# Patient Record
Sex: MALE | Race: WHITE | NOT HISPANIC OR LATINO | Employment: FULL TIME | URBAN - NONMETROPOLITAN AREA
[De-identification: names, ages, dates, MRNs, and addresses within clinical notes are randomized per-mention and may not be internally consistent; named-entity substitution may affect disease eponyms.]

---

## 2018-05-29 ENCOUNTER — HOSPITAL ENCOUNTER (EMERGENCY)
Facility: HOSPITAL | Age: 28
Discharge: HOME | End: 2018-05-29
Attending: EMERGENCY MEDICINE | Admitting: EMERGENCY MEDICINE

## 2018-05-29 ENCOUNTER — APPOINTMENT (OUTPATIENT)
Dept: GENERAL RADIOLOGY | Facility: HOSPITAL | Age: 28
End: 2018-05-29

## 2018-05-29 VITALS
SYSTOLIC BLOOD PRESSURE: 118 MMHG | HEIGHT: 71 IN | TEMPERATURE: 97.6 F | OXYGEN SATURATION: 95 % | BODY MASS INDEX: 23.8 KG/M2 | HEART RATE: 73 BPM | DIASTOLIC BLOOD PRESSURE: 67 MMHG | WEIGHT: 170 LBS | RESPIRATION RATE: 18 BRPM

## 2018-05-29 DIAGNOSIS — S61.012A LACERATION OF LEFT THUMB WITHOUT FOREIGN BODY WITHOUT DAMAGE TO NAIL, INITIAL ENCOUNTER: Primary | ICD-10-CM

## 2018-05-29 DIAGNOSIS — IMO0002 TENDON LACERATION: ICD-10-CM

## 2018-05-29 PROCEDURE — 90715 TDAP VACCINE 7 YRS/> IM: CPT | Performed by: EMERGENCY MEDICINE

## 2018-05-29 PROCEDURE — 99284 EMERGENCY DEPT VISIT MOD MDM: CPT

## 2018-05-29 PROCEDURE — 90471 IMMUNIZATION ADMIN: CPT | Performed by: EMERGENCY MEDICINE

## 2018-05-29 PROCEDURE — 73130 X-RAY EXAM OF HAND: CPT

## 2018-05-29 PROCEDURE — 25010000002 TDAP 5-2.5-18.5 LF-MCG/0.5 SUSPENSION: Performed by: EMERGENCY MEDICINE

## 2018-05-29 RX ORDER — GINSENG 100 MG
CAPSULE ORAL 2 TIMES DAILY
Qty: 14 G | Refills: 0 | Status: SHIPPED | OUTPATIENT
Start: 2018-05-29 | End: 2018-06-05

## 2018-05-29 RX ORDER — LEVOFLOXACIN 500 MG/1
500 TABLET, FILM COATED ORAL ONCE
Status: COMPLETED | OUTPATIENT
Start: 2018-05-29 | End: 2018-05-29

## 2018-05-29 RX ORDER — CIPROFLOXACIN 500 MG/1
500 TABLET, FILM COATED ORAL 2 TIMES DAILY
Qty: 10 TABLET | Refills: 0 | Status: SHIPPED | OUTPATIENT
Start: 2018-05-29 | End: 2018-06-29

## 2018-05-29 RX ORDER — DIAPER,BRIEF,INFANT-TODD,DISP
EACH MISCELLANEOUS
Status: COMPLETED
Start: 2018-05-29 | End: 2018-05-29

## 2018-05-29 RX ORDER — IBUPROFEN 600 MG/1
600 TABLET ORAL EVERY 8 HOURS PRN
Qty: 21 TABLET | Refills: 0 | Status: SHIPPED | OUTPATIENT
Start: 2018-05-29 | End: 2018-06-29

## 2018-05-29 RX ORDER — OXYCODONE HYDROCHLORIDE AND ACETAMINOPHEN 5; 325 MG/1; MG/1
1 TABLET ORAL ONCE
Status: COMPLETED | OUTPATIENT
Start: 2018-05-29 | End: 2018-05-29

## 2018-05-29 RX ORDER — DIAPER,BRIEF,INFANT-TODD,DISP
EACH MISCELLANEOUS ONCE
Status: COMPLETED | OUTPATIENT
Start: 2018-05-29 | End: 2018-05-29

## 2018-05-29 RX ADMIN — LEVOFLOXACIN 500 MG: 500 TABLET, FILM COATED ORAL at 22:34

## 2018-05-29 RX ADMIN — OXYCODONE HYDROCHLORIDE AND ACETAMINOPHEN 1 TABLET: 5; 325 TABLET ORAL at 21:14

## 2018-05-29 RX ADMIN — Medication: at 22:41

## 2018-05-29 RX ADMIN — TETANUS TOXOID, REDUCED DIPHTHERIA TOXOID AND ACELLULAR PERTUSSIS VACCINE, ADSORBED 0.5 ML: 5; 2.5; 8; 8; 2.5 SUSPENSION INTRAMUSCULAR at 21:15

## 2018-05-29 RX ADMIN — BACITRACIN: 500 OINTMENT TOPICAL at 22:41

## 2018-06-01 ENCOUNTER — OFFICE VISIT (OUTPATIENT)
Dept: ORTHOPEDIC SURGERY | Facility: CLINIC | Age: 28
End: 2018-06-01

## 2018-06-01 VITALS — HEIGHT: 71 IN | WEIGHT: 171 LBS | BODY MASS INDEX: 23.94 KG/M2

## 2018-06-01 DIAGNOSIS — S56.522A: ICD-10-CM

## 2018-06-01 DIAGNOSIS — M79.645 PAIN OF LEFT THUMB: Primary | ICD-10-CM

## 2018-06-01 DIAGNOSIS — S51.002A: ICD-10-CM

## 2018-06-01 PROCEDURE — 99204 OFFICE O/P NEW MOD 45 MIN: CPT | Performed by: ORTHOPAEDIC SURGERY

## 2018-06-01 RX ORDER — BUPIVACAINE HCL/0.9 % NACL/PF 0.1 %
2 PLASTIC BAG, INJECTION (ML) EPIDURAL EVERY 8 HOURS
Status: CANCELLED | OUTPATIENT
Start: 2018-06-07 | End: 2018-06-08

## 2018-06-01 NOTE — PROGRESS NOTES
Wilner Lobato is a 27 y.o. male   Primary provider:  No Known Provider       Chief Complaint   Patient presents with   • Left Thumb - Pain       HISTORY OF PRESENT ILLNESS: Patient being seen for left thumb pain due to injury occurring on 5/29/2018. Patient seen at  ED and x-rays done.     Pain   This is a new problem. The current episode started in the past 7 days. The problem occurs constantly. Associated symptoms comments: Stabbing, aching, bruising, swelling. The symptoms are aggravated by walking (driving).        Injured left hand when swinging an axe.  Lacerated thumb.  Injured and was evaluated in the emergency dept.        CONCURRENT MEDICAL HISTORY:    History reviewed. No pertinent past medical history.    Allergies   Allergen Reactions   • Penicillins          Current Outpatient Prescriptions:   •  bacitracin 500 UNIT/GM ointment, Apply  topically 2 (Two) Times a Day., Disp: 14 g, Rfl: 0  •  ciprofloxacin (CIPRO) 500 MG tablet, Take 1 tablet by mouth 2 (Two) Times a Day., Disp: 10 tablet, Rfl: 0  •  ibuprofen (ADVIL,MOTRIN) 600 MG tablet, Take 1 tablet by mouth Every 8 (Eight) Hours As Needed for Moderate Pain ., Disp: 21 tablet, Rfl: 0    History reviewed. No pertinent surgical history.    History reviewed. No pertinent family history.     Social History     Social History   • Marital status: Single     Spouse name: N/A   • Number of children: N/A   • Years of education: N/A     Occupational History   • Not on file.     Social History Main Topics   • Smoking status: Former Smoker     Years: 5.00   • Smokeless tobacco: Never Used   • Alcohol use No   • Drug use: Unknown   • Sexual activity: Not on file     Other Topics Concern   • Not on file     Social History Narrative   • No narrative on file        Review of Systems   Constitutional: Negative.    HENT: Negative.    Eyes: Negative.    Respiratory: Negative.    Cardiovascular: Negative.    Gastrointestinal: Negative.    Endocrine: Negative.   "  Genitourinary: Negative.    Musculoskeletal: Negative.    Skin: Negative.    Allergic/Immunologic: Negative.    Neurological: Negative.    Hematological: Negative.    Psychiatric/Behavioral: Negative.        PHYSICAL EXAMINATION:       Ht 180.3 cm (71\")   Wt 77.6 kg (171 lb)   BMI 23.85 kg/m²     Physical Exam    GAIT:     [x]  Normal  []  Antalgic    Assistive device: [x]  None  []  Walker     []  Crutches  []  Cane     []  Wheelchair  []  Stretcher    Left Hand Exam     Tenderness   The patient is experiencing tenderness in the dorsal area.     Range of Motion     Wrist   Extension: 5   Flexion: 10     Hand   MP Thumb: 0   MP Index: 90   MP Middle: 90   MP Rin   DIP Thumb: 0     Other   Erythema: absent          Laceration left thumb over extensor surface metacarpal phalangeal joint.     Xr Hand 3+ View Left    Result Date: 2018  Narrative: Exam: Left hand three views INDICATION: Injury FINDINGS: Three views. No acute fracture or dislocation. Joint spaces are maintained. No lytic or destructive lesion. No radiopaque foreign body.     Impression: No fracture or radiopaque foreign body. Electronically signed by:  Hosea Cho MD  2018 10:15 PM CDT Workstation: [id number]          ASSESSMENT:    Diagnoses and all orders for this visit:    Pain of left thumb    Extensor tendon laceration of elbow with open wound, left, initial encounter  -     Case Request; Standing  -     ceFAZolin (ANCEF) 2 g in sodium chloride 0.9 % 100 mL IVPB; Infuse 2 g into a venous catheter Every 8 (Eight) Hours.    Other orders  -     Obtain informed consent; Future  -     Obtain Informed Consent; Standing  -     aPTT; Standing  -     Basic Metabolic Panel; Standing  -     CBC (No Diff); Standing  -     Protime-INR; Standing          PLAN    Discussed treatment options, recommend repair extensor tendon, discussed risks including rerupture, failure and revision surgery , which he understands and agrees to proceed.  "         John Flanagan MD

## 2018-06-05 ENCOUNTER — APPOINTMENT (OUTPATIENT)
Dept: PREADMISSION TESTING | Facility: HOSPITAL | Age: 28
End: 2018-06-05

## 2018-06-05 VITALS
WEIGHT: 199 LBS | HEART RATE: 69 BPM | BODY MASS INDEX: 27.86 KG/M2 | RESPIRATION RATE: 12 BRPM | OXYGEN SATURATION: 98 % | DIASTOLIC BLOOD PRESSURE: 60 MMHG | HEIGHT: 71 IN | SYSTOLIC BLOOD PRESSURE: 108 MMHG

## 2018-06-05 LAB
ANION GAP SERPL CALCULATED.3IONS-SCNC: 11 MMOL/L (ref 5–15)
APTT PPP: 28.5 SECONDS (ref 20–40.3)
BUN BLD-MCNC: 12 MG/DL (ref 7–21)
BUN/CREAT SERPL: 14.8 (ref 7–25)
CALCIUM SPEC-SCNC: 9.9 MG/DL (ref 8.4–10.2)
CHLORIDE SERPL-SCNC: 105 MMOL/L (ref 95–110)
CO2 SERPL-SCNC: 26 MMOL/L (ref 22–31)
CREAT BLD-MCNC: 0.81 MG/DL (ref 0.7–1.3)
DEPRECATED RDW RBC AUTO: 41.6 FL (ref 35.1–43.9)
ERYTHROCYTE [DISTWIDTH] IN BLOOD BY AUTOMATED COUNT: 12.7 % (ref 11.5–14.5)
GFR SERPL CREATININE-BSD FRML MDRD: 113 ML/MIN/1.73 (ref 77–179)
GLUCOSE BLD-MCNC: 115 MG/DL (ref 60–100)
HCT VFR BLD AUTO: 44.2 % (ref 39–49)
HGB BLD-MCNC: 15.6 G/DL (ref 13.7–17.3)
INR PPP: 1 (ref 0.8–1.2)
MCH RBC QN AUTO: 31.7 PG (ref 26.5–34)
MCHC RBC AUTO-ENTMCNC: 35.3 G/DL (ref 31.5–36.3)
MCV RBC AUTO: 89.8 FL (ref 80–98)
PLATELET # BLD AUTO: 236 10*3/MM3 (ref 150–450)
PMV BLD AUTO: 10.4 FL (ref 8–12)
POTASSIUM BLD-SCNC: 4.2 MMOL/L (ref 3.5–5.1)
PROTHROMBIN TIME: 13 SECONDS (ref 11.1–15.3)
RBC # BLD AUTO: 4.92 10*6/MM3 (ref 4.37–5.74)
SODIUM BLD-SCNC: 142 MMOL/L (ref 137–145)
WBC NRBC COR # BLD: 5.48 10*3/MM3 (ref 3.2–9.8)

## 2018-06-05 PROCEDURE — 85610 PROTHROMBIN TIME: CPT | Performed by: ORTHOPAEDIC SURGERY

## 2018-06-05 PROCEDURE — 85730 THROMBOPLASTIN TIME PARTIAL: CPT | Performed by: ORTHOPAEDIC SURGERY

## 2018-06-05 PROCEDURE — 80048 BASIC METABOLIC PNL TOTAL CA: CPT | Performed by: ORTHOPAEDIC SURGERY

## 2018-06-05 PROCEDURE — 85027 COMPLETE CBC AUTOMATED: CPT | Performed by: ORTHOPAEDIC SURGERY

## 2018-06-05 RX ORDER — SODIUM CHLORIDE, SODIUM GLUCONATE, SODIUM ACETATE, POTASSIUM CHLORIDE, AND MAGNESIUM CHLORIDE 526; 502; 368; 37; 30 MG/100ML; MG/100ML; MG/100ML; MG/100ML; MG/100ML
1000 INJECTION, SOLUTION INTRAVENOUS CONTINUOUS
Status: CANCELLED | OUTPATIENT
Start: 2018-06-07

## 2018-06-05 NOTE — DISCHARGE INSTRUCTIONS
[facility]  Pre-op Information and Guidelines    You will be called after 2 p.m. the day before your surgery (Friday for Monday surgery) and notified of your time for arrival and approximate surgery time.  If you have not received a call by 4P.M., please contact Same Day Surgery at [phone] of if outside [facility] call [phone].    Please Follow these Important Safety Guidelines:    • The morning of your procedure, take only the medications listed below with   A sip of water:_____________________________________________       ______________________________________________    • DO NOT eat or drink anything after 12:00 midnight the night before surgery  Specific instructions concerning drinking clear liquids will be discussed during  the pre-surgery instruction call the day before your surgery.    • If you take a blood thinner (ex. Plavix, Coumadin, aspirin), ask your doctor when to stop it before surgery  STOP DATE: _________________    • Only 2 visitors are allowed in patient rooms at a time  Your visitors will be asked to wait in the lobby until the admission process is complete with the exception of a parent with a child and patients in need of special assistance.    • YOU CANNOT DRIVE YOURSELF HOME  You must be accompanied by someone who will be responsible for driving you home after surgery and for your care at home.    • DO NOT chew gum, use breath mints, hard candy, or smoke the day of surgery  • DO NOT drink alcohol for at least 24 hours before your surgery  • DO NOT wear any jewelry and remove all body piercing before coming to the hospital  • DO NOT wear make-up to the hospital  • If you are having surgery on an extremity (arm/leg/foot) remove nail polish/artificial nails on the surgical side  • Clothing, glasses, contacts, dentures, and hairpieces must be removed before surgery  • Bathe the night before or the morning of your surgery and do not use powders/lotions on  skin.

## 2018-06-06 ENCOUNTER — ANESTHESIA EVENT (OUTPATIENT)
Dept: PERIOP | Facility: HOSPITAL | Age: 28
End: 2018-06-06

## 2018-06-07 ENCOUNTER — HOSPITAL ENCOUNTER (OUTPATIENT)
Facility: HOSPITAL | Age: 28
Setting detail: HOSPITAL OUTPATIENT SURGERY
Discharge: HOME | End: 2018-06-07
Attending: ORTHOPAEDIC SURGERY | Admitting: ORTHOPAEDIC SURGERY

## 2018-06-07 ENCOUNTER — ANESTHESIA (OUTPATIENT)
Dept: PERIOP | Facility: HOSPITAL | Age: 28
End: 2018-06-07

## 2018-06-07 VITALS
HEIGHT: 71 IN | RESPIRATION RATE: 20 BRPM | HEART RATE: 69 BPM | WEIGHT: 192.02 LBS | SYSTOLIC BLOOD PRESSURE: 141 MMHG | DIASTOLIC BLOOD PRESSURE: 77 MMHG | OXYGEN SATURATION: 97 % | TEMPERATURE: 97.2 F | BODY MASS INDEX: 26.88 KG/M2

## 2018-06-07 DIAGNOSIS — S56.522A: ICD-10-CM

## 2018-06-07 DIAGNOSIS — S51.002A: ICD-10-CM

## 2018-06-07 PROCEDURE — 25010000002 DEXAMETHASONE PER 1 MG: Performed by: NURSE ANESTHETIST, CERTIFIED REGISTERED

## 2018-06-07 PROCEDURE — 25010000002 ONDANSETRON PER 1 MG: Performed by: NURSE ANESTHETIST, CERTIFIED REGISTERED

## 2018-06-07 PROCEDURE — 25010000002 MIDAZOLAM PER 1 MG: Performed by: NURSE ANESTHETIST, CERTIFIED REGISTERED

## 2018-06-07 PROCEDURE — 25010000002 PROPOFOL 10 MG/ML EMULSION: Performed by: NURSE ANESTHETIST, CERTIFIED REGISTERED

## 2018-06-07 PROCEDURE — 26410 REPAIR HAND TENDON: CPT | Performed by: ORTHOPAEDIC SURGERY

## 2018-06-07 PROCEDURE — 25010000002 FENTANYL CITRATE (PF) 100 MCG/2ML SOLUTION: Performed by: NURSE ANESTHETIST, CERTIFIED REGISTERED

## 2018-06-07 RX ORDER — BUPIVACAINE HCL/0.9 % NACL/PF 0.1 %
2 PLASTIC BAG, INJECTION (ML) EPIDURAL EVERY 8 HOURS
Status: DISCONTINUED | OUTPATIENT
Start: 2018-06-07 | End: 2018-06-07 | Stop reason: HOSPADM

## 2018-06-07 RX ORDER — SODIUM CHLORIDE, SODIUM GLUCONATE, SODIUM ACETATE, POTASSIUM CHLORIDE, AND MAGNESIUM CHLORIDE 526; 502; 368; 37; 30 MG/100ML; MG/100ML; MG/100ML; MG/100ML; MG/100ML
1000 INJECTION, SOLUTION INTRAVENOUS CONTINUOUS
Status: DISCONTINUED | OUTPATIENT
Start: 2018-06-07 | End: 2018-06-07 | Stop reason: HOSPADM

## 2018-06-07 RX ORDER — LIDOCAINE HYDROCHLORIDE 20 MG/ML
INJECTION, SOLUTION INFILTRATION; PERINEURAL AS NEEDED
Status: DISCONTINUED | OUTPATIENT
Start: 2018-06-07 | End: 2018-06-07 | Stop reason: SURG

## 2018-06-07 RX ORDER — HYDROCODONE BITARTRATE AND ACETAMINOPHEN 7.5; 325 MG/1; MG/1
1 TABLET ORAL EVERY 6 HOURS PRN
Status: DISCONTINUED | OUTPATIENT
Start: 2018-06-07 | End: 2018-06-07 | Stop reason: HOSPADM

## 2018-06-07 RX ORDER — MORPHINE SULFATE 10 MG/ML
4 INJECTION INTRAMUSCULAR; INTRAVENOUS; SUBCUTANEOUS EVERY 4 HOURS PRN
Status: DISCONTINUED | OUTPATIENT
Start: 2018-06-07 | End: 2018-06-07 | Stop reason: HOSPADM

## 2018-06-07 RX ORDER — HYDROCODONE BITARTRATE AND ACETAMINOPHEN 7.5; 325 MG/1; MG/1
1 TABLET ORAL EVERY 6 HOURS PRN
Qty: 50 TABLET | Refills: 0 | Status: SHIPPED | OUTPATIENT
Start: 2018-06-07 | End: 2018-06-29

## 2018-06-07 RX ORDER — ONDANSETRON 2 MG/ML
INJECTION INTRAMUSCULAR; INTRAVENOUS AS NEEDED
Status: DISCONTINUED | OUTPATIENT
Start: 2018-06-07 | End: 2018-06-07 | Stop reason: SURG

## 2018-06-07 RX ORDER — MEPERIDINE HYDROCHLORIDE 50 MG/ML
12.5 INJECTION INTRAMUSCULAR; INTRAVENOUS; SUBCUTANEOUS
Status: COMPLETED | OUTPATIENT
Start: 2018-06-07 | End: 2018-06-07

## 2018-06-07 RX ORDER — BACTERIOSTATIC SODIUM CHLORIDE 0.9 %
VIAL (ML) INJECTION AS NEEDED
Status: DISCONTINUED | OUTPATIENT
Start: 2018-06-07 | End: 2018-06-07 | Stop reason: HOSPADM

## 2018-06-07 RX ORDER — PROPOFOL 10 MG/ML
VIAL (ML) INTRAVENOUS AS NEEDED
Status: DISCONTINUED | OUTPATIENT
Start: 2018-06-07 | End: 2018-06-07 | Stop reason: SURG

## 2018-06-07 RX ORDER — ONDANSETRON 2 MG/ML
4 INJECTION INTRAMUSCULAR; INTRAVENOUS ONCE AS NEEDED
Status: DISCONTINUED | OUTPATIENT
Start: 2018-06-07 | End: 2018-06-07 | Stop reason: HOSPADM

## 2018-06-07 RX ORDER — MIDAZOLAM HYDROCHLORIDE 1 MG/ML
INJECTION INTRAMUSCULAR; INTRAVENOUS AS NEEDED
Status: DISCONTINUED | OUTPATIENT
Start: 2018-06-07 | End: 2018-06-07 | Stop reason: SURG

## 2018-06-07 RX ORDER — PROMETHAZINE HYDROCHLORIDE 25 MG/ML
12.5 INJECTION, SOLUTION INTRAMUSCULAR; INTRAVENOUS EVERY 6 HOURS PRN
Status: DISCONTINUED | OUTPATIENT
Start: 2018-06-07 | End: 2018-06-07 | Stop reason: HOSPADM

## 2018-06-07 RX ORDER — FENTANYL CITRATE 50 UG/ML
INJECTION, SOLUTION INTRAMUSCULAR; INTRAVENOUS AS NEEDED
Status: DISCONTINUED | OUTPATIENT
Start: 2018-06-07 | End: 2018-06-07 | Stop reason: SURG

## 2018-06-07 RX ORDER — BACITRACIN 50000 [IU]/1
INJECTION, POWDER, FOR SOLUTION INTRAMUSCULAR AS NEEDED
Status: DISCONTINUED | OUTPATIENT
Start: 2018-06-07 | End: 2018-06-07 | Stop reason: HOSPADM

## 2018-06-07 RX ORDER — MEPERIDINE HYDROCHLORIDE 50 MG/ML
12.5 INJECTION INTRAMUSCULAR; INTRAVENOUS; SUBCUTANEOUS
Status: DISCONTINUED | OUTPATIENT
Start: 2018-06-07 | End: 2018-06-07 | Stop reason: HOSPADM

## 2018-06-07 RX ORDER — DEXAMETHASONE SODIUM PHOSPHATE 4 MG/ML
INJECTION, SOLUTION INTRA-ARTICULAR; INTRALESIONAL; INTRAMUSCULAR; INTRAVENOUS; SOFT TISSUE AS NEEDED
Status: DISCONTINUED | OUTPATIENT
Start: 2018-06-07 | End: 2018-06-07 | Stop reason: SURG

## 2018-06-07 RX ORDER — ALBUTEROL SULFATE 2.5 MG/3ML
2.5 SOLUTION RESPIRATORY (INHALATION) ONCE AS NEEDED
Status: DISCONTINUED | OUTPATIENT
Start: 2018-06-07 | End: 2018-06-07 | Stop reason: HOSPADM

## 2018-06-07 RX ADMIN — SODIUM CHLORIDE, SODIUM GLUCONATE, SODIUM ACETATE, POTASSIUM CHLORIDE, AND MAGNESIUM CHLORIDE: 526; 502; 368; 37; 30 INJECTION, SOLUTION INTRAVENOUS at 09:50

## 2018-06-07 RX ADMIN — ONDANSETRON 4 MG: 2 INJECTION INTRAMUSCULAR; INTRAVENOUS at 09:10

## 2018-06-07 RX ADMIN — MEPERIDINE HYDROCHLORIDE 12.5 MG: 50 INJECTION INTRAMUSCULAR; INTRAVENOUS; SUBCUTANEOUS at 10:27

## 2018-06-07 RX ADMIN — FENTANYL CITRATE 50 MCG: 50 INJECTION, SOLUTION INTRAMUSCULAR; INTRAVENOUS at 08:36

## 2018-06-07 RX ADMIN — SODIUM CHLORIDE, SODIUM GLUCONATE, SODIUM ACETATE, POTASSIUM CHLORIDE, AND MAGNESIUM CHLORIDE 1000 ML: 526; 502; 368; 37; 30 INJECTION, SOLUTION INTRAVENOUS at 07:35

## 2018-06-07 RX ADMIN — PROPOFOL 200 MG: 10 INJECTION, EMULSION INTRAVENOUS at 08:36

## 2018-06-07 RX ADMIN — MEPERIDINE HYDROCHLORIDE 12.5 MG: 50 INJECTION INTRAMUSCULAR; INTRAVENOUS; SUBCUTANEOUS at 10:17

## 2018-06-07 RX ADMIN — FENTANYL CITRATE 25 MCG: 50 INJECTION, SOLUTION INTRAMUSCULAR; INTRAVENOUS at 09:05

## 2018-06-07 RX ADMIN — SODIUM CHLORIDE, SODIUM GLUCONATE, SODIUM ACETATE, POTASSIUM CHLORIDE, AND MAGNESIUM CHLORIDE: 526; 502; 368; 37; 30 INJECTION, SOLUTION INTRAVENOUS at 09:18

## 2018-06-07 RX ADMIN — FENTANYL CITRATE 25 MCG: 50 INJECTION, SOLUTION INTRAMUSCULAR; INTRAVENOUS at 09:20

## 2018-06-07 RX ADMIN — LIDOCAINE HYDROCHLORIDE 100 MG: 20 INJECTION, SOLUTION INFILTRATION; PERINEURAL at 08:36

## 2018-06-07 RX ADMIN — MEPERIDINE HYDROCHLORIDE 12.5 MG: 50 INJECTION INTRAMUSCULAR; INTRAVENOUS; SUBCUTANEOUS at 10:37

## 2018-06-07 RX ADMIN — Medication 2 G: at 08:43

## 2018-06-07 RX ADMIN — MIDAZOLAM 2 MG: 1 INJECTION INTRAMUSCULAR; INTRAVENOUS at 08:26

## 2018-06-07 RX ADMIN — MEPERIDINE HYDROCHLORIDE 12.5 MG: 50 INJECTION INTRAMUSCULAR; INTRAVENOUS; SUBCUTANEOUS at 10:22

## 2018-06-07 RX ADMIN — DEXAMETHASONE SODIUM PHOSPHATE 4 MG: 4 INJECTION, SOLUTION INTRAMUSCULAR; INTRAVENOUS at 08:50

## 2018-06-07 NOTE — OP NOTE
Diagnosis left hand extensor pollicis longus tendon laceration, left thumb  Diagnosis left hand extensor pollicis longus tendon laceration, left thumb  Surgery; repair left thumb extensor pollicis longus tendon laceration, placement left forearm thumb spica splint  General anesthetic  Surgeon Kiarra Rasheed   Assist SMcGregor  Blood loss 10  The patient is brought to the operating room, he is anesthetized.  Airway is secured.  The left arm has a tourniquet placed.  The left hand is washed with a chloroprep based surgical scrub, sterile surgical barriers are placed in the usual fashion, sterile surgical technique is performed throughout the course of the surgical procedure.  Consent and IV antibiotics are reviewed.  The left arm is exsanguinated, tourniquet is inflated.  The previous sutures are removed from the left hand laceration overlying the dorsal aspect of the metacarpal phalangeal joint.  The incision is extended proximally and distally by 5cm.  I elevated skin flaps, proximally and distally.  I exposed the tendon, the tenosynovium.  I identified a lacerated tendon, the extensor pollicus longus of the left thumb.  The more proximal aspect of the tendon is exposed, dissected of adjacent sheath, and mobilized.  Distally, I found the lacerated tendon, dissected of the sheath and mobilized.  I repaired the lacerated tendon with an end to end repair, 6 strands passing across the interface and transverse passes of the suture to grab the tendon using 2-0 ethibond.  The tendon is reapproximated with the thumb held in extension and restoration of tendon tension.  The repair is sutured together.   There is good tension of the EPL.  I irrigated the wound with copious lavage.   The skin is sutured closed with nylon.  Sterile dressings are placed.  The left hand/forearm is wrapped with cast padding,  A thumb spica splint is placed for immobilization of the thumb.  The patient is awakened.  No complications.

## 2018-06-07 NOTE — DISCHARGE INSTRUCTIONS
No use of the left hand  Do not remove the splint.  Keep the splint clean and dry, do not get the splint wet  No work with the left hand  Return for follow up appointment orthopaedic surgery in 3 weeks.          Minor Surgery  Outpatient Instructions    General Information  You have had a minor surgical procedure and are not expected to require extensive treatment or a long recovery period.  However, the following information/instructions that are listed serve as guidelines to help you recover at home.    Activity:DO NOT USE LEFT HAND, KEEP LEFT HAND ELEVATED ABOVE HEART.    Hygiene: SPONGE BATH    Dressing/Wound Care: KEEP DRESSING AND SPLINT CLEAN AND DRY    Diet: AS TOLERATED.    Important Points:  -Call your doctor to report any of the following:   -unusual or excessive bleeding/drainage   -pain not reduced/controlled by medication   -elevated temperature consistently greater that 100 degrees F   -increased swelling, redness, bruising, or tenderness in surgical area  -Take medications as prescribed by your physician  -If you have any questions, please contact your doctor or the Same Day Surgery Unit at   [phone]  -If a post-operative emergency occurs, report to your nearest ER

## 2018-06-07 NOTE — ANESTHESIA PREPROCEDURE EVALUATION
Anesthesia Evaluation     Patient summary reviewed   NPO Solid Status: > 8 hours  NPO Liquid Status: > 8 hours           Airway   Mallampati: I  TM distance: >3 FB  Neck ROM: full  No difficulty expected  Dental    (+) poor dentition    Pulmonary     breath sounds clear to auscultation  (+) a smoker Former,   Cardiovascular     Rhythm: regular  Rate: normal        Neuro/Psych  GI/Hepatic/Renal/Endo    (+)  GERD well controlled,      Musculoskeletal     Abdominal     Abdomen: soft.   Substance History      OB/GYN          Other                        Anesthesia Plan    ASA 2     general     intravenous induction   Anesthetic plan and risks discussed with patient.

## 2018-06-07 NOTE — ANESTHESIA PROCEDURE NOTES
Airway    General Information and Staff    Patient location during procedure: OR    Indications and Patient Condition  Indications for airway management: airway protection    Preoxygenated: yes  MILS maintained throughout  Mask difficulty assessment: 0 - not attempted    Final Airway Details  Final airway type: supraglottic airway      Successful airway: I-gel  Size 4    Number of attempts at approach: 1

## 2018-06-07 NOTE — ANESTHESIA POSTPROCEDURE EVALUATION
Patient: Wilner Lobato    Procedure Summary     Date:  06/07/18 Room / Location:  [facility] OR  / [facility] OR    Anesthesia Start:  0829 Anesthesia Stop:  0954    Procedure:  EXTENSOR TENDON REPAIR LEFT HAND (Left Fingers) Diagnosis:       Extensor tendon laceration of elbow with open wound, left, initial encounter      (Extensor tendon laceration of elbow with open wound, left, initial encounter [S56.922A, S51.002A])    Surgeon:  John Flanagan MD Provider:  Roscoe Matamoros MD    Anesthesia Type:  general ASA Status:  2          Anesthesia Type: general  Last vitals  BP   117/71 (06/07/18 0725)   Temp   97.7 °F (36.5 °C) (06/07/18 0725)   Pulse   57 (06/07/18 0725)   Resp   18 (06/07/18 0725)     SpO2   97 % (06/07/18 0725)     Post Anesthesia Care and Evaluation    Patient location during evaluation: bedside  Patient participation: complete - patient cannot participate  Level of consciousness: obtunded/minimal responses  Pain score: 0  Pain management: adequate  Airway patency: patent (lma)  Anesthetic complications: No anesthetic complications  PONV Status: none  Cardiovascular status: acceptable  Respiratory status: acceptable  Hydration status: acceptable

## 2018-06-07 NOTE — H&P (VIEW-ONLY)
Wilner Lobato is a 27 y.o. male   Primary provider:  No Known Provider       Chief Complaint   Patient presents with   • Left Thumb - Pain       HISTORY OF PRESENT ILLNESS: Patient being seen for left thumb pain due to injury occurring on 5/29/2018. Patient seen at  ED and x-rays done.     Pain   This is a new problem. The current episode started in the past 7 days. The problem occurs constantly. Associated symptoms comments: Stabbing, aching, bruising, swelling. The symptoms are aggravated by walking (driving).        Injured left hand when swinging an axe.  Lacerated thumb.  Injured and was evaluated in the emergency dept.        CONCURRENT MEDICAL HISTORY:    History reviewed. No pertinent past medical history.    Allergies   Allergen Reactions   • Penicillins          Current Outpatient Prescriptions:   •  bacitracin 500 UNIT/GM ointment, Apply  topically 2 (Two) Times a Day., Disp: 14 g, Rfl: 0  •  ciprofloxacin (CIPRO) 500 MG tablet, Take 1 tablet by mouth 2 (Two) Times a Day., Disp: 10 tablet, Rfl: 0  •  ibuprofen (ADVIL,MOTRIN) 600 MG tablet, Take 1 tablet by mouth Every 8 (Eight) Hours As Needed for Moderate Pain ., Disp: 21 tablet, Rfl: 0    History reviewed. No pertinent surgical history.    History reviewed. No pertinent family history.     Social History     Social History   • Marital status: Single     Spouse name: N/A   • Number of children: N/A   • Years of education: N/A     Occupational History   • Not on file.     Social History Main Topics   • Smoking status: Former Smoker     Years: 5.00   • Smokeless tobacco: Never Used   • Alcohol use No   • Drug use: Unknown   • Sexual activity: Not on file     Other Topics Concern   • Not on file     Social History Narrative   • No narrative on file        Review of Systems   Constitutional: Negative.    HENT: Negative.    Eyes: Negative.    Respiratory: Negative.    Cardiovascular: Negative.    Gastrointestinal: Negative.    Endocrine: Negative.   "  Genitourinary: Negative.    Musculoskeletal: Negative.    Skin: Negative.    Allergic/Immunologic: Negative.    Neurological: Negative.    Hematological: Negative.    Psychiatric/Behavioral: Negative.        PHYSICAL EXAMINATION:       Ht 180.3 cm (71\")   Wt 77.6 kg (171 lb)   BMI 23.85 kg/m²     Physical Exam    GAIT:     [x]  Normal  []  Antalgic    Assistive device: [x]  None  []  Walker     []  Crutches  []  Cane     []  Wheelchair  []  Stretcher    Left Hand Exam     Tenderness   The patient is experiencing tenderness in the dorsal area.     Range of Motion     Wrist   Extension: 5   Flexion: 10     Hand   MP Thumb: 0   MP Index: 90   MP Middle: 90   MP Rin   DIP Thumb: 0     Other   Erythema: absent          Laceration left thumb over extensor surface metacarpal phalangeal joint.     Xr Hand 3+ View Left    Result Date: 2018  Narrative: Exam: Left hand three views INDICATION: Injury FINDINGS: Three views. No acute fracture or dislocation. Joint spaces are maintained. No lytic or destructive lesion. No radiopaque foreign body.     Impression: No fracture or radiopaque foreign body. Electronically signed by:  Hosae Cho MD  2018 10:15 PM CDT Workstation: [id number]          ASSESSMENT:    Diagnoses and all orders for this visit:    Pain of left thumb    Extensor tendon laceration of elbow with open wound, left, initial encounter  -     Case Request; Standing  -     ceFAZolin (ANCEF) 2 g in sodium chloride 0.9 % 100 mL IVPB; Infuse 2 g into a venous catheter Every 8 (Eight) Hours.    Other orders  -     Obtain informed consent; Future  -     Obtain Informed Consent; Standing  -     aPTT; Standing  -     Basic Metabolic Panel; Standing  -     CBC (No Diff); Standing  -     Protime-INR; Standing          PLAN    Discussed treatment options, recommend repair extensor tendon, discussed risks including rerupture, failure and revision surgery , which he understands and agrees to proceed.  "         John Flanagan MD

## 2018-06-07 NOTE — BRIEF OP NOTE
FINGER EXTENSOR TENDON REPAIR  Progress Note    Wilner Lobato  6/7/2018    Pre-op Diagnosis:   Extensor tendon laceration of elbow with open wound, left, initial encounter [S56.922A, S51.002A]       Post-Op Diagnosis Codes:     * Extensor tendon laceration of elbow with open wound, left, initial encounter [S56.922A, S51.002A]    Procedure/CPT® Codes:  IN REPAIR EXTEN TENDON,DORSUM HAND,EA [68284]  IN APPLY FOREARM SPLINT,STATIC [33522]    Procedure(s):  EXTENSOR TENDON REPAIR LEFT HAND    Surgeon(s):  John Flanagan MD    Anesthesia: General    Staff:   Circulator: Mague Lemons RN; Sixto Haro RN  Scrub Person: Betzy Awad  Assistant: Karolyn Gongora CSA    Estimated Blood Loss: 10    Urine Voided: * No values recorded between 6/7/2018  8:28 AM and 6/7/2018  9:54 AM *    Specimens:                None      Drains:      Findings: lacerated extensor tendon to the left thumb, EPL     Complications: none       John Flanagan MD     Date: 6/7/2018  Time: 10:44 AM

## 2018-06-29 ENCOUNTER — OFFICE VISIT (OUTPATIENT)
Dept: ORTHOPEDIC SURGERY | Facility: CLINIC | Age: 28
End: 2018-06-29

## 2018-06-29 DIAGNOSIS — M79.645 PAIN OF LEFT THUMB: Primary | ICD-10-CM

## 2018-06-29 DIAGNOSIS — S61.012D LACERATION OF THUMB, LEFT, WITH TENDON INVOLVEMENT, SUBSEQUENT ENCOUNTER: ICD-10-CM

## 2018-06-29 PROCEDURE — 99024 POSTOP FOLLOW-UP VISIT: CPT | Performed by: ORTHOPAEDIC SURGERY

## 2018-06-29 PROCEDURE — 29075 APPL CST ELBW FNGR SHORT ARM: CPT | Performed by: ORTHOPAEDIC SURGERY

## 2018-06-29 NOTE — PROGRESS NOTES
Wilner Lobato is a 27 y.o. male returns for     Chief Complaint   Patient presents with   • Left Hand - Post-op     thumb   • Suture / Staple Removal       HISTORY OF PRESENT ILLNESS: EXTENSOR TENDON REPAIR LEFT HAND done on 6/7/2018    Returns for evaluation following laceration EPL left thumb.  He has been compliant with restrictions regarding use of left hand.         CONCURRENT MEDICAL HISTORY:    No past medical history on file.    Allergies   Allergen Reactions   • Penicillins Other (See Comments)     childhood       No current outpatient prescriptions on file.    Past Surgical History:   Procedure Laterality Date   • FINGER TENDON REPAIR Left 6/7/2018    Procedure: EXTENSOR TENDON REPAIR LEFT HAND;  Surgeon: John Flanagan MD;  Location: [facility];  Service: Orthopedics       ROS  No fevers or chills.  No chest pain or shortness of air.  No GI or  disturbances.    PHYSICAL EXAMINATION:       There were no vitals taken for this visit.    Physical Exam    GAIT:     [x]  Normal  []  Antalgic    Assistive device: [x]  None  []  Walker     []  Crutches  []  Cane     []  Wheelchair  []  Stretcher    Ortho Exam    Incision clean and dry, no erythema, no drainage  Digit with brisk capillary refill      No results found.          ASSESSMENT:    Diagnoses and all orders for this visit:    Pain of left thumb    Laceration of thumb, left, with tendon involvement, subsequent encounter          PLAN  Placement short arm thumb spica cast for 3 weeks, before he begins therapy for hand motion.  Cast care precautions reviewed.          John Flanagan MD

## 2018-07-20 ENCOUNTER — OFFICE VISIT (OUTPATIENT)
Dept: ORTHOPEDIC SURGERY | Facility: CLINIC | Age: 28
End: 2018-07-20

## 2018-07-20 VITALS — HEIGHT: 71 IN | WEIGHT: 195 LBS | BODY MASS INDEX: 27.3 KG/M2

## 2018-07-20 DIAGNOSIS — S51.002A: Primary | ICD-10-CM

## 2018-07-20 DIAGNOSIS — M79.645 PAIN OF LEFT THUMB: ICD-10-CM

## 2018-07-20 DIAGNOSIS — S56.522A: Primary | ICD-10-CM

## 2018-07-20 DIAGNOSIS — S61.012D LACERATION OF THUMB, LEFT, WITH TENDON INVOLVEMENT, SUBSEQUENT ENCOUNTER: ICD-10-CM

## 2018-07-20 PROCEDURE — 99024 POSTOP FOLLOW-UP VISIT: CPT | Performed by: ORTHOPAEDIC SURGERY

## 2018-07-20 NOTE — PROGRESS NOTES
"Postop Follow-up    Name:  Wilner Lobato  Date:  2018  :  1990    Chief Complaint:    Chief Complaint   Patient presents with   • Left Thumb - Follow-up     Date of surgery:         18 (43d) John Flanagan MD    EXTENSOR TENDON REPAIR LEFT HAND - Left         Procedure:    History of Present Illness:recheck left thumb  Pain scale today 2/10  Cast removed, he has been compliant with cast restrictions, precautions and generalized non use of the left hand.      No current outpatient prescriptions on file.    Allergies   Allergen Reactions   • Penicillins Other (See Comments)     childhood         Exam:  Vitals:    18 1000   Weight: 88.5 kg (195 lb)   Height: 180.3 cm (71\")       The patient is awake, alert, and oriented and in no apparent distress.    Right upper extremity:    Left upper extremity:    Incision healed, no erythema, no drainage  Thumb IP joint flexion 20  IP joint extension is 0  No tenderness with palpation.      Right lower extremity:    Left lower extremity:      No results found.      Assessment:  Diagnoses and all orders for this visit:    Extensor tendon laceration of elbow with open wound, left, initial encounter  -     Ambulatory Referral to Occupational Therapy  -      Hand Finger Orthosis (universal Thumb Splint)          Plan:    Begin therapy for hand use  Extension dynamic splinting brace for left hand.    Continued non use of the left hand, outside of therapy.  I stressed to him he is not to use his hand for work purposes at this point.      No Follow-up on file.      18 at 9:56 AM by John Flanagan MD  "

## 2018-07-23 ENCOUNTER — HOSPITAL ENCOUNTER (OUTPATIENT)
Dept: PHYSICAL THERAPY | Facility: HOSPITAL | Age: 28
Setting detail: THERAPIES SERIES
Discharge: HOME | End: 2018-07-23

## 2018-07-23 DIAGNOSIS — S51.002A: ICD-10-CM

## 2018-07-23 DIAGNOSIS — M79.645 PAIN OF LEFT THUMB: Primary | ICD-10-CM

## 2018-07-23 DIAGNOSIS — S56.522A: ICD-10-CM

## 2018-07-23 PROCEDURE — 97110 THERAPEUTIC EXERCISES: CPT | Performed by: PHYSICAL THERAPIST

## 2018-07-23 PROCEDURE — 97760 ORTHOTIC MGMT&TRAING 1ST ENC: CPT | Performed by: PHYSICAL THERAPIST

## 2018-07-23 PROCEDURE — 97162 PT EVAL MOD COMPLEX 30 MIN: CPT | Performed by: PHYSICAL THERAPIST

## 2018-07-23 NOTE — THERAPY EVALUATION
"    Outpatient Physical Therapy Hand Initial Evaluation   [facility]     Patient Name: Wilner Lobato  : 1990  MRN: [id number]  Today's Date: 2018         Visit Date: 2018  Attendance:  (30/yr)  Subjective Improvement: n/a  Next MD Appt: 18  Recert Date: 18    Therapy Diagnosis: L EPL repair 18         History reviewed. No pertinent past medical history.     Past Surgical History:   Procedure Laterality Date   • FINGER TENDON REPAIR Left 2018    Procedure: EXTENSOR TENDON REPAIR LEFT HAND;  Surgeon: John Flanagan MD;  Location: [facility] OR;  Service: Orthopedics     No current outpatient prescriptions on file.    Allergies   Allergen Reactions   • Penicillins Other (See Comments)     childhood       Visit Dx:    ICD-10-CM ICD-9-CM   1. Pain of left thumb M79.645 729.5   2. Extensor tendon laceration of elbow with open wound, left, initial encounter S56.922A 881.21    S51.002A              Patient History     Row Name 18 1300          Chief Complaint Pain;Difficulty with daily activities;Joint stiffness  -    Type of Pain Hand pain   left  -SS    Date Current Problem(s) Began 18  -    Brief Description of Current Complaint Patient injured his left thumb while chopping limps with a hatchet. Lacerated his extensor pollicis longus tendon. Underwent surgical repair on 18. In post-op  splint for 3 weeks then thumb spica cast for 3 weeks. He has been out of the cast since 18. Left thumb is painful when he moves the thumb. He has been following Dr. Flanagan's restrictions. Single male with children.  -SS    Patient/Caregiver Goals Relieve pain;Improve mobility   \"I'd like there to be no pain and full movement.\"  -SS    Current Tobacco Use no  -SS    Smoking Status former  -SS    Patient's Rating of General Health Excellent  -SS    Hand Dominance right-handed  -SS    Occupation/sports/leisure activities [employer] - , " "missed 3 weeks of work, has restrictions. Hobbies: hunting, fishing, playing guitar, yard work  -SS    Surgery/Hospitalization EPL repair 6/7/18  -SS          Pain Location Finger (Comment which one);Hand;Wrist   left thumb  -SS    Pain at Present 1  -SS    Pain at Best 1  -SS    Pain at Worst 8  -SS    Pain Frequency Intermittent  -SS    Pain Description Sharp  -SS    What Performance Factors Make the Current Problem(s) WORSE? trying to move the thumb, hits thumb  -SS    What Performance Factors Make the Current Problem(s) BETTER? rest hand  -SS    Is your sleep disturbed? No  -SS    Is medication used to assist with sleep? No  -SS    Difficulties at work? \"don't lift anything heavy\"  -SS    Difficulties with ADL's? decreased use L hand  -SS    Difficulties with recreational activities? decreased use L hand  -SS          Any falls in the past year: No  -SS    Does patient have a fear of falling No  -SS          Primary Language English  -SS          Are you being hurt, hit, or frightened by anyone at home or in your life? No  -SS    Are you being neglected by a caregiver No  -SS      User Key  (r) = Recorded By, (t) = Taken By, (c) = Cosigned By    Initials Name Provider Type    SS Dominic Lee, SILVESTRE DPT Physical Therapist             Hand Therapy (last 24 hours)      Hand Eval     Row Name 07/23/18 1300          Splint Type Forearm based  -SS    Immobilized with Wrist in neutral  -SS    Select Digits Thumb  -SS    Dynamic Outriggers Extension  -SS    Splint Purpose Increase AROM;Increase PROM  -SS    During (condition to wear splint) Acute healing;Inflammatory stage  -SS    Use (daily wear) Intermittent  -SS    Splint Use (overall time to wear splint) Per surgeons time frame  -SS          Hand ROM Tested? Left Thumb- AROM;Right Thumb- AROM  -SS          MP Flexion - AROM 60  -SS    MP Extension- AROM -10  -SS    IP Flexion - AROM 55  -SS    IP Extension- AROM -5  -SS          MP Flexion - AROM 50  -SS    MP " Extension- AROM -30  -SS    IP Flexion - AROM 35  -SS    IP Extension- AROM 0  -SS    CMC Radial Abduction- AROM 30  -SS      User Key  (r) = Recorded By, (t) = Taken By, (c) = Cosigned By    Initials Name Provider Type    MANDY Lee, PT DPT Physical Therapist              PT Ortho     Row Name 07/23/18 1300       Subjective Comments    Subjective Comments see Therapy Patient History  -       Precautions and Contraindications    Precautions L EPL repair 6/7/18  -       Subjective Pain    Able to rate subjective pain? yes  -SS    Pre-Treatment Pain Level 1  -SS       Posture/Observations    Posture/Observations Comments Scar from healing incision dorsal L 1st ray.  -SS       Left Upper Ext    Lt Wrist Flexion AROM 60  -SS    Lt Wrist Extension AROM 71   wrist joint pain  -SS    Lt  Ulnar Deviation AROM 37   mild wrist pain  -SS    Lt  Radial Deviation AROM 20   mild wrist pain  -SS       Sensation    Light Touch --   intact to light touch dorsal thumb  -SS    Additional Comments WEST Monofilament Test L hand: 0.07g each digit  -SS       Hand  Strength     Strength Affected Side --    and pinch strength deferred this date  -      User Key  (r) = Recorded By, (t) = Taken By, (c) = Cosigned By    Initials Name Provider Type     Dominic Lee, PT DPT Physical Therapist                    Therapy Education  Education Details: Isolated thumb IP flexion/extension, composite thumb flexion/extension, thumb to tip and slide; orthosis wear and care  Given: HEP, Symptoms/condition management  Program: New  How Provided: Verbal, Demonstration  Provided to: Patient  Level of Understanding: Verbalized, Demonstrated          PT OP Goals     Row Name 07/23/18 1300          STG Date to Achieve --   deferred  -SS          LTG Date to Achieve 08/13/18  -SS    LTG 1 Independent with HEP  -SS    LTG 2 Increase thumb composite flexion to tip to SF PIP flexor crease  -SS    LTG 3 L thumb MP  extension to -10 deg or better  -          PT Goal Re-Cert Due Date 08/13/18  -      User Key  (r) = Recorded By, (t) = Taken By, (c) = Cosigned By    Initials Name Provider Type     Dominic Lee, PT DPT Physical Therapist                PT Assessment/Plan     Row Name 07/23/18 1300          Functional Limitations Limitation in home management;Limitations in community activities;Limitations in functional capacity and performance;Performance in leisure activities;Performance in self-care ADL;Performance in work activities  -    Impairments Dexterity;Joint mobility;Muscle strength;Pain;Range of motion  -    Assessment Comments Patient is 6 weeks, 4 days s/p L EPL repair. Patient was instructed in HEP and orthosis wear. Orthosis for dynamic thumb extension mobilization. Will be limited in attendance due to works out of town.  -    Rehab Potential Excellent   barrier: attendance limited due to working out of town  -    Patient/caregiver participated in establishment of treatment plan and goals Yes  -    Patient would benefit from skilled therapy intervention Yes  -SS          PT Frequency 1x/week  -    Predicted Duration of Therapy Intervention (Therapy Eval) 2-4 weeks  -    PT Plan Comments Fluidotherapy, ROM, gentle stretching, gentle strengthening, scar massage  -      User Key  (r) = Recorded By, (t) = Taken By, (c) = Cosigned By    Initials Name Provider Type     Dominic Lee, PT DPT Physical Therapist                  Exercises     Row Name 07/23/18 1300          Subjective Comments see Therapy Patient History  -          Able to rate subjective pain? yes  -SS    Pre-Treatment Pain Level 1  -SS    Post-Treatment Pain Level 2  -          Exercise Name 1 Fluidotherapy with AROM  -    Cueing 1 Verbal  -    Time 1 15 mins  -      User Key  (r) = Recorded By, (t) = Taken By, (c) = Cosigned By    Initials Name Provider Type     Dominic Lee, PT DPT Physical  Therapist                       Outcome Measure Options: Quick DASH  Quick DASH  Open a tight or new jar.: Severe Difficulty  Do heavy household chores (e.g., wash walls, wash floors): Severe Difficulty  Carry a shopping bag or briefcase: Moderate Difficulty  Wash your back: Moderate Difficulty  Use a knife to cut food: Moderate Difficulty  Recreational activities in which you take some force or impact through your arm, should or hand (e.g. golf, hammering, tennis, etc.): Unable  During the past week, to what extent has your arm, shoulder, or hand problem interfered with your normal social activities with family, friends, neighbors or groups?: Quite a bit  During the past week, were you limited in your work or other regular daily activities as a result of your arm, shoulder or hand problem?: Very limited  Arm, Shoulder, or hand pain: Severe  Tingling (pins and needles) in your arm, shoulder, or hand: None  During the past week, how much difficulty have you had sleeping because of the pain in your arm, shoulder or hand?: No difficulty  Number of Questions Answered: 11  Quick DASH Score: 56.82         Time Calculation:   Start Time: 1259  Stop Time: 1418  Time Calculation (min): 79 min     Therapy Charges for Today     Code Description Service Date Service Provider Modifiers Qty    48213841551 HC PT EVAL MOD COMPLEXITY 2 7/23/2018 Dominic Lee PT DPT GP 1    33641017066 HC PT THER PROC EA 15 MIN 7/23/2018 Dominic Lee PT DPT GP 1    76083988117 HC ORTHOTIC(S) MGMT/TRAIN INITIAL ENCOUNTER, EACH 15MIN 7/23/2018 Dominic Lee PT DPT GP 2                   Dominic Lee, PT, DPT, CHT  7/23/2018

## 2018-08-03 ENCOUNTER — HOSPITAL ENCOUNTER (OUTPATIENT)
Dept: PHYSICAL THERAPY | Facility: HOSPITAL | Age: 28
Setting detail: THERAPIES SERIES
Discharge: HOME | End: 2018-08-03

## 2018-08-03 DIAGNOSIS — S51.002A: ICD-10-CM

## 2018-08-03 DIAGNOSIS — S56.522A: ICD-10-CM

## 2018-08-03 DIAGNOSIS — M79.645 PAIN OF LEFT THUMB: Primary | ICD-10-CM

## 2018-08-03 PROCEDURE — 97110 THERAPEUTIC EXERCISES: CPT | Performed by: PHYSICAL THERAPIST

## 2018-08-03 NOTE — THERAPY TREATMENT NOTE
Outpatient Physical Therapy Hand Treatment Note   [facility]     Patient Name: Wilner Lobato  : 1990  MRN: [id number]  Today's Date: 8/3/2018         Visit Date: 2018  Attendance: 2/2 (30/yr)  Subjective Improvement: 40%  Next MD Appt: 18  Recert Date: 18     Therapy Diagnosis: L EPL repair 18     Visit Dx:    ICD-10-CM ICD-9-CM   1. Pain of left thumb M79.645 729.5   2. Extensor tendon laceration of elbow with open wound, left, initial encounter S56.922A 881.21    S51.002A                Hand Therapy (last 24 hours)      Hand Eval     Row Name 18 1400             Right Thumb AROM    MP Flexion - AROM 65  -SS      MP Extension- AROM -25  -SS      IP Flexion - AROM 55  -SS      IP Extension- AROM 0  -SS         Hand  Strength     Strength Affected Side Bilateral  -SS          Strength Right    # Reps 1  -SS      Right Rung 2  -SS      Right  Test 1 90  -SS       Strength Average Right 90  -SS          Strength Left    # Reps 1  -SS      Left Rung 2  -SS      Left  Test 1 50  -SS       Strength Average Left 50  -SS        User Key  (r) = Recorded By, (t) = Taken By, (c) = Cosigned By    Initials Name Provider Type    Dominic Aggarwal, PT DPT Physical Therapist              PT Ortho     Row Name 18 1400       Posture/Observations    Posture/Observations Comments opposes thumb to SF flexor crease  -SS      User Key  (r) = Recorded By, (t) = Taken By, (c) = Cosigned By    Initials Name Provider Type    Dominic Aggarwal, PT DPT Physical Therapist                        PT Assessment/Plan     Row Name 18 1400          PT Assessment    Functional Limitations Limitation in home management;Limitations in community activities;Limitations in functional capacity and performance;Performance in leisure activities;Performance in self-care ADL;Performance in work activities  -     Impairments Dexterity;Joint  mobility;Muscle strength;Pain;Range of motion  -     Assessment Comments 7 weeks, 4 days post-op. Motion is progressing. Sore with activity. Counseled patient to make sure no extension lag and to use caution with hand.  -     Rehab Potential Excellent   barrier: attendance limited by working out of town  -     Patient/caregiver participated in establishment of treatment plan and goals Yes  -SS     Patient would benefit from skilled therapy intervention Yes  -SS        PT Plan    PT Frequency Other (comment)   PRN  -SS     PT Plan Comments Continue HEP. Contact P.T. clinic if questions or concerns arise.   -       User Key  (r) = Recorded By, (t) = Taken By, (c) = Cosigned By    Initials Name Provider Type    Dominic Aggarwal, PT DPT Physical Therapist                    Exercises     Row Name 08/03/18 1400             Subjective Comments    Subjective Comments 40% subjective improvement. Uses hand some at work. Hurst if he bumps it.  -SS         Subjective Pain    Able to rate subjective pain? yes  -SS      Pre-Treatment Pain Level 3  -SS         Exercise 1    Exercise Name 1 Fluidotherapy with AROM  -SS      Cueing 1 Verbal  -SS      Time 1 15 mins  -SS         Exercise 2    Exercise Name 2 LLPS MP extension  -SS      Cueing 2 Verbal;Demo  -SS      Time 2 2 mins  -SS         Exercise 3    Exercise Name 3 Thumb MP extension  -SS      Cueing 3 Verbal  -SS      Reps 3 20  -SS        User Key  (r) = Recorded By, (t) = Taken By, (c) = Cosigned By    Initials Name Provider Type     Dominic Lee, PT DPT Physical Therapist                               PT OP Goals     Row Name 08/03/18 1400          PT Short Term Goals    STG Date to Achieve --   deferred  -SS        Long Term Goals    LTG Date to Achieve 08/13/18  -SS     LTG 1 Independent with HEP  -SS     LTG 1 Progress Ongoing  -     LTG 2 Increase thumb composite flexion to tip to SF PIP flexor crease  -     LTG 2 Progress Met  -      LTG 3 L thumb MP extension to -10 deg or better  -     LTG 3 Progress Ongoing  -        Time Calculation    PT Goal Re-Cert Due Date 08/13/18  -       User Key  (r) = Recorded By, (t) = Taken By, (c) = Cosigned By    Initials Name Provider Type    SS Dominic Lee, PT DPT Physical Therapist          Therapy Education  Given: Symptoms/condition management  How Provided: Verbal  Provided to: Patient  Level of Understanding: Verbalized              Time Calculation:   Start Time: 1433  Stop Time: 1504  Time Calculation (min): 31 min     Therapy Charges for Today     Code Description Service Date Service Provider Modifiers Qty    63283741464 HC PT THER PROC EA 15 MIN 8/3/2018 Dominic Lee, PT DPT GP 2                   Dominic Lee, PT, DPT, CHT  8/3/2018

## 2018-09-21 ENCOUNTER — OFFICE VISIT (OUTPATIENT)
Dept: ORTHOPEDIC SURGERY | Facility: CLINIC | Age: 28
End: 2018-09-21

## 2018-09-21 VITALS — HEIGHT: 71 IN | BODY MASS INDEX: 28.38 KG/M2 | WEIGHT: 202.7 LBS

## 2018-09-21 DIAGNOSIS — M79.645 PAIN OF LEFT THUMB: Primary | ICD-10-CM

## 2018-09-21 DIAGNOSIS — S61.012D LACERATION OF THUMB, LEFT, WITH TENDON INVOLVEMENT, SUBSEQUENT ENCOUNTER: ICD-10-CM

## 2018-09-21 PROCEDURE — 99212 OFFICE O/P EST SF 10 MIN: CPT | Performed by: ORTHOPAEDIC SURGERY

## 2018-09-21 NOTE — PROGRESS NOTES
"Wilner Lobato is a 27 y.o. male returns for     Chief Complaint   Patient presents with   • Left Thumb - Follow-up       HISTORY OF PRESENT ILLNESS: Patient being seen for left thumb follow up.     Doing well, states thumb is bothersome if he 'bumps the thumb'.  Overall he is well, no complaints, states he is using thumb, therapy for 2 visits.  States therapy was not helpful.       CONCURRENT MEDICAL HISTORY:    The following portions of the patient's history were reviewed and updated as appropriate: allergies, current medications, past family history, past medical history, past social history, past surgical history and problem list.     ROS  No fevers or chills.  No chest pain or shortness of air.  No GI or  disturbances.    PHYSICAL EXAMINATION:       Ht 180.3 cm (71\")   Wt 91.9 kg (202 lb 11.2 oz)   BMI 28.27 kg/m²     Physical Exam    GAIT:     [x]  Normal  []  Antalgic    Assistive device: [x]  None  []  Walker     []  Crutches  []  Cane     []  Wheelchair  []  Stretcher    Ortho Exam   Incision healed, no erythema, no drainage  Thumb extension   No subluxation.        Exam: Left hand three views     INDICATION: Injury     FINDINGS: Three views. No acute fracture or dislocation. Joint  spaces are maintained. No lytic or destructive lesion. No  radiopaque foreign body.     IMPRESSION:  No fracture or radiopaque foreign body.     Electronically signed by:  Hosea Cho MD  5/29/2018 10:15 PM  CDT Workstation: [id number]          ASSESSMENT:    Diagnoses and all orders for this visit:    Pain of left thumb    Laceration of thumb, left, with tendon involvement, subsequent encounter          PLAN      No follow up needed.      John Flanagan MD    "

## 2019-02-25 ENCOUNTER — DOCUMENTATION (OUTPATIENT)
Dept: PHYSICAL THERAPY | Facility: HOSPITAL | Age: 29
End: 2019-02-25

## 2019-02-25 DIAGNOSIS — S51.002A: ICD-10-CM

## 2019-02-25 DIAGNOSIS — M79.645 PAIN OF LEFT THUMB: Primary | ICD-10-CM

## 2019-02-25 DIAGNOSIS — S56.522A: ICD-10-CM

## (undated) DEVICE — SUT NLY 2/0 664G

## (undated) DEVICE — UNDRPD BREATH 23X36 BG/10

## (undated) DEVICE — SYR LL TP 10ML STRL

## (undated) DEVICE — GLV SURG SENSICARE MICRO PF LF 7.5 STRL

## (undated) DEVICE — SUT VIC 3/0 SH 27IN J416H

## (undated) DEVICE — SUT ETHLN 4/0 FS2 18IN 662H

## (undated) DEVICE — BNDG ELAS ELITE V/CLOSE 4IN 5YD LF STRL

## (undated) DEVICE — PAD UNDERCAST WYTEX 4IN 4YD LF STRL

## (undated) DEVICE — TOWEL,OR,DSP,ST,BLUE,DLX,8/PK,10PK/CS: Brand: MEDLINE

## (undated) DEVICE — INTENDED FOR TISSUE SEPARATION, AND OTHER PROCEDURES THAT REQUIRE A SHARP SURGICAL BLADE TO PUNCTURE OR CUT.: Brand: BARD-PARKER ® CARBON RIB-BACK BLADES

## (undated) DEVICE — GLV SURG TRIUMPH ORTHO W/ALOE PF LTX 7.0

## (undated) DEVICE — GOWN,AURORA,NOREINF,RAGLAN,XL,STERILE: Brand: MEDLINE

## (undated) DEVICE — GLV SURG SENSICARE GREEN W/ALOE PF LF 7 STRL

## (undated) DEVICE — GLV SURG SENSICARE GREEN W/ALOE PF LF 8 STRL

## (undated) DEVICE — DISPOSABLE BIPOLAR CODE, 12' (3.66 M): Brand: CONMED

## (undated) DEVICE — GLV SURG SENSICARE ALOE LF PF SZ7.5 GRN

## (undated) DEVICE — DISPOSABLE TOURNIQUET CUFF SINGLE BLADDER, DUAL PORT AND QUICK CONNECT CONNECTOR: Brand: COLOR CUFF

## (undated) DEVICE — SUT VICRYL 2-0  X-1 27IN ETVCP459H PLS

## (undated) DEVICE — NDL HYPO SFTY GLD 22G 1 1/2IN

## (undated) DEVICE — PK EXTRM LF 60

## (undated) DEVICE — SPNG GZ WOVN 4X4IN 12PLY 10/BX STRL

## (undated) DEVICE — GLV SURG TRIUMPH LT PF LTX 9 STRL

## (undated) DEVICE — DRAPE,U/ SHT,SPLIT,PLAS,STERIL: Brand: MEDLINE

## (undated) DEVICE — DRSNG GZ CURAD XEROFORM NONADHS 5X9IN STRL

## (undated) DEVICE — APPL CHLORAPREP W/TINT 26ML ORNG

## (undated) DEVICE — SUT ETHLN 3-0 FS118IN 663H

## (undated) DEVICE — GLV SURG TRIUMPH LT PF LTX 6.5 STRL

## (undated) DEVICE — CAUTERY TIP POLISHER: Brand: DEVON

## (undated) DEVICE — SOL IRR NACL 0.9PCT BT 1000ML